# Patient Record
Sex: MALE | Race: WHITE | NOT HISPANIC OR LATINO | Employment: OTHER | URBAN - METROPOLITAN AREA
[De-identification: names, ages, dates, MRNs, and addresses within clinical notes are randomized per-mention and may not be internally consistent; named-entity substitution may affect disease eponyms.]

---

## 2021-01-09 ENCOUNTER — IMMUNIZATION (OUTPATIENT)
Dept: FAMILY MEDICINE | Facility: CLINIC | Age: 76
End: 2021-01-09
Payer: OTHER GOVERNMENT

## 2021-01-09 DIAGNOSIS — Z23 NEED FOR VACCINATION: ICD-10-CM

## 2021-01-09 PROCEDURE — 91300 COVID-19, MRNA, LNP-S, PF, 30 MCG/0.3 ML DOSE VACCINE: CPT | Mod: PBBFAC,PO

## 2021-01-30 ENCOUNTER — IMMUNIZATION (OUTPATIENT)
Dept: FAMILY MEDICINE | Facility: CLINIC | Age: 76
End: 2021-01-30
Payer: OTHER GOVERNMENT

## 2021-01-30 DIAGNOSIS — Z23 NEED FOR VACCINATION: Primary | ICD-10-CM

## 2021-01-30 PROCEDURE — 91300 COVID-19, MRNA, LNP-S, PF, 30 MCG/0.3 ML DOSE VACCINE: CPT | Mod: PBBFAC,PO

## 2021-01-30 PROCEDURE — 0002A COVID-19, MRNA, LNP-S, PF, 30 MCG/0.3 ML DOSE VACCINE: CPT | Mod: PBBFAC,PO

## 2022-01-25 ENCOUNTER — TELEPHONE (OUTPATIENT)
Dept: VASCULAR SURGERY | Facility: CLINIC | Age: 77
End: 2022-01-25
Payer: OTHER GOVERNMENT

## 2022-01-25 NOTE — TELEPHONE ENCOUNTER
Spoke to pt. Wife Nelly. Will schedule pt for Feb 24. @1:45 pm. She verbalized understanding and phone call ended.

## 2022-01-25 NOTE — TELEPHONE ENCOUNTER
----- Message from Tim Livingston sent at 1/25/2022 11:55 AM CST -----  Type:  Appointment Request    Caller is requesting an appointment.   Name of Caller:  Pt's Wife - Nelly  When is the first available appointment?    Symptoms:    Best Call Back Number:  [phone]   Additional Information:  Has a referral from Dr. Avila .  Please advise -- Thank you

## 2022-02-24 ENCOUNTER — INITIAL CONSULT (OUTPATIENT)
Dept: VASCULAR SURGERY | Facility: CLINIC | Age: 77
End: 2022-02-24
Payer: MEDICARE

## 2022-02-24 VITALS
BODY MASS INDEX: 29.4 KG/M2 | HEIGHT: 73 IN | RESPIRATION RATE: 20 BRPM | WEIGHT: 221.81 LBS | DIASTOLIC BLOOD PRESSURE: 74 MMHG | SYSTOLIC BLOOD PRESSURE: 167 MMHG | HEART RATE: 73 BPM

## 2022-02-24 DIAGNOSIS — I65.23 BILATERAL CAROTID ARTERY STENOSIS: Primary | ICD-10-CM

## 2022-02-24 PROCEDURE — 99205 PR OFFICE/OUTPT VISIT, NEW, LEVL V, 60-74 MIN: ICD-10-PCS | Mod: S$GLB,,, | Performed by: THORACIC SURGERY (CARDIOTHORACIC VASCULAR SURGERY)

## 2022-02-24 PROCEDURE — 99214 OFFICE O/P EST MOD 30 MIN: CPT | Mod: PBBFAC,PO | Performed by: THORACIC SURGERY (CARDIOTHORACIC VASCULAR SURGERY)

## 2022-02-24 PROCEDURE — 99999 PR PBB SHADOW E&M-EST. PATIENT-LVL IV: ICD-10-PCS | Mod: PBBFAC,,, | Performed by: THORACIC SURGERY (CARDIOTHORACIC VASCULAR SURGERY)

## 2022-02-24 PROCEDURE — 99205 OFFICE O/P NEW HI 60 MIN: CPT | Mod: S$GLB,,, | Performed by: THORACIC SURGERY (CARDIOTHORACIC VASCULAR SURGERY)

## 2022-02-24 PROCEDURE — 99999 PR PBB SHADOW E&M-EST. PATIENT-LVL IV: CPT | Mod: PBBFAC,,, | Performed by: THORACIC SURGERY (CARDIOTHORACIC VASCULAR SURGERY)

## 2022-02-24 RX ORDER — LOSARTAN POTASSIUM AND HYDROCHLOROTHIAZIDE 12.5; 1 MG/1; MG/1
1 TABLET ORAL DAILY
COMMUNITY
Start: 2021-12-20

## 2022-02-24 RX ORDER — FERROUS SULFATE 325(65) MG
TABLET ORAL
COMMUNITY

## 2022-02-24 RX ORDER — ASPIRIN 325 MG
325 TABLET, DELAYED RELEASE (ENTERIC COATED) ORAL
COMMUNITY

## 2022-02-24 RX ORDER — HYDRALAZINE HYDROCHLORIDE 100 MG/1
100 TABLET, FILM COATED ORAL 3 TIMES DAILY
COMMUNITY
Start: 2022-02-17

## 2022-02-24 RX ORDER — CHLORPHENIRAMINE MALEATE 4 MG
4 TABLET ORAL
COMMUNITY

## 2022-02-24 RX ORDER — CLOPIDOGREL BISULFATE 75 MG/1
75 TABLET ORAL DAILY
COMMUNITY
Start: 2022-01-06

## 2022-02-24 RX ORDER — MINOXIDIL 2.5 MG/1
5 TABLET ORAL 2 TIMES DAILY
COMMUNITY
Start: 2022-01-06

## 2022-02-24 RX ORDER — FUROSEMIDE 40 MG/1
20 TABLET ORAL
COMMUNITY

## 2022-02-24 RX ORDER — CAPTOPRIL 100 MG/1
100 TABLET ORAL 2 TIMES DAILY
COMMUNITY
Start: 2021-12-01

## 2022-02-24 RX ORDER — MECOBALAMIN 1000 MCG
TABLET,CHEWABLE ORAL
COMMUNITY

## 2022-02-24 RX ORDER — LEVOTHYROXINE SODIUM 150 UG/1
150 TABLET ORAL DAILY
COMMUNITY
Start: 2021-12-18

## 2022-02-24 RX ORDER — VIT C/E/ZN/COPPR/LUTEIN/ZEAXAN 250MG-90MG
CAPSULE ORAL
COMMUNITY

## 2022-02-24 RX ORDER — BRIMONIDINE TARTRATE 1 MG/ML
SOLUTION/ DROPS OPHTHALMIC
COMMUNITY
Start: 2022-01-07

## 2022-02-24 RX ORDER — BIMATOPROST 0.1 MG/ML
SOLUTION/ DROPS OPHTHALMIC
COMMUNITY

## 2022-02-24 RX ORDER — POTASSIUM CHLORIDE 20 MEQ/1
TABLET, EXTENDED RELEASE ORAL
COMMUNITY

## 2022-02-24 NOTE — PROGRESS NOTES
OFFICE VISIT      This patient was referred to me by Dr João Avila.    HISTORY OF PRESENT ILLNESS:  The patient is a 76-year-old gentleman who had what was described as a stroke 6 years ago.  Apparently he was in a chair and when he got up to go to the bathroom, he was leaning towards 1 side as he was walking.  It also seemed to his wife that he was unsteady.  He then stated that he was having tingling and numbness of the left upper extremity but had no amaurosis fugax nor did he have any lateralizing weakness.  Carotid imaging did not show any significant disease.  Over the subsequent years he has developed weakness of bilateral lower extremities to the point where he is afraid to stand up on walk.  He now does not walk without the use of a rolling walker. He has maintained very good strength of the upper extremities.  CT angiogram of the carotid arteries in 2020 showed less than 50% stenosis of bilateral carotid arteries.  An ultrasound done at Dr. Avila's office on 01/03/2020 showed bilateral 50-69% stenosis of the internal carotid arteries.  The patient has had no new neurologic symptoms since the reported stroke happened 6 years ago.        Past Medical History:   Diagnosis Date    Carotid artery occlusion     Stroke     Thyroid disease        History reviewed. No pertinent surgical history.    Review of patient's allergies indicates:  No Known Allergies    Current Outpatient Medications   Medication Sig Dispense Refill    ALPHAGAN P 0.1 % Drop Place into both eyes.      aspirin (ECOTRIN) 325 MG EC tablet Take 325 mg by mouth.      bimatoprost (LUMIGAN) 0.01 % Drop Lumigan 0.01 % eye drops   INSTILL 1 DROP INTO EACH EYE AT BEDTIME      captopriL (CAPOTEN) 100 MG Tab Take 100 mg by mouth 2 (two) times daily.      chlorpheniramine (CHLOR-TRIMETON) 4 mg tablet Take 4 mg by mouth.      cholecalciferol, vitamin D3, (VITAMIN D3) 25 mcg (1,000 unit) capsule Vitamin D3 25 mcg (1,000 unit) capsule   Take  by oral route.      clopidogreL (PLAVIX) 75 mg tablet Take 75 mg by mouth once daily.      ferrous sulfate (FEOSOL) 325 mg (65 mg iron) Tab tablet iron      furosemide (LASIX) 40 MG tablet 20 mg.      hydrALAZINE (APRESOLINE) 100 MG tablet Take 100 mg by mouth 3 (three) times daily.      levothyroxine (SYNTHROID) 150 MCG tablet Take 150 mcg by mouth once daily.      losartan-hydrochlorothiazide 100-12.5 mg (HYZAAR) 100-12.5 mg Tab Take 1 tablet by mouth once daily.      mecobalamin, vitamin B12, (B12 ACTIVE) 1,000 mcg Chew B12   1000mg      minoxidiL (LONITEN) 2.5 MG tablet Take 5 mg by mouth 2 (two) times daily.      potassium chloride SA (K-DUR,KLOR-CON) 20 MEQ tablet Klor-Con       No current facility-administered medications for this visit.       History reviewed. No pertinent family history.    Social History     Socioeconomic History    Marital status:    Tobacco Use    Smoking status: Former Smoker     Years: 50.00     Types: Cigarettes     Quit date: 2014     Years since quittin.0       REVIEW OF SYSTEMS:  As per history of present illness.        PHYSICAL EXAM:  Physical Exam    Vitals:    22 1356   BP: (!) 167/74   Pulse: 73   Resp: 20     Awake, alert and oriented.  Head is normocephalic.  Atraumatic.  Pupils are equal, round and reactive.  Sclerae anicteric.  Neck is supple without jugular vein distension trachea is midline.  Heart has regular rate and rhythm.  Lungs are clear.  Abdomen is soft and nontender.  Extremities are warm and adequately perfused.  Neurologic:  Awake, alert and oriented.  He has normal strength of the upper extremities with a very strong hand  bilaterally.  He has significant weakness of the lower extremities and has significant difficulty going from the sitting to the standing position.  He has to use rolling walker to ambulate.    IMPRESSION:  1. Asymptomatic moderate grade stenosis of bilateral internal carotid arteries.   2. Weakness of  bilateral lower extremities  3. Questionable history of stroke.   4. Hypothyroidism    RECOMMENDATIONS:  The patient has asymptomatic bilateral 50-69% stenosis of the carotid arteries.  He had some neurologic symptoms that the aorta told constituted a stroke 6 years ago.  By definition, patients who have no symptoms in the previous 1 year are considered asymptomatic.  CT angiogram done in 2020 showed less than 50% stenosis of bilateral internal carotid arteries.  I will recommend repeat carotid ultrasounds in 6 months.        Glenroy Lopez MD